# Patient Record
(demographics unavailable — no encounter records)

---

## 2025-02-24 NOTE — HEALTH RISK ASSESSMENT
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

## 2025-02-24 NOTE — ASSESSMENT
[FreeTextEntry1] : #HCM -pap smear UTD  -flu shot deferred, covid -f/u labs, STD testing  #Eczema -using tacrolimus -topical steroid as needed -following with derm and allergist  #Anxiety, agoraphobia -behavioral health referral

## 2025-02-24 NOTE — HISTORY OF PRESENT ILLNESS
[FreeTextEntry1] : CPE [de-identified] : 22 y/o F PMH generalized anxiety disorder, agoraphobia, blepharitis, eczema presents for CPE. Pt would like to see therapist. Used to struggle with panic attacks, worsened in Covid. In 2023 felt recovered but in past year and half . On average two times a week. Went to derm and allergist. Did have severe eczema of her hands, has improved now using tacrolimus few times week. Got off topical steroid 1 month ago. Has only had testing for environmental allergies